# Patient Record
Sex: MALE | Race: WHITE | NOT HISPANIC OR LATINO | Employment: FULL TIME | URBAN - METROPOLITAN AREA
[De-identification: names, ages, dates, MRNs, and addresses within clinical notes are randomized per-mention and may not be internally consistent; named-entity substitution may affect disease eponyms.]

---

## 2023-08-03 ENCOUNTER — OFFICE VISIT (OUTPATIENT)
Dept: FAMILY MEDICINE | Facility: CLINIC | Age: 28
End: 2023-08-03
Payer: COMMERCIAL

## 2023-08-03 VITALS
TEMPERATURE: 99.1 F | HEART RATE: 101 BPM | DIASTOLIC BLOOD PRESSURE: 78 MMHG | OXYGEN SATURATION: 98 % | HEIGHT: 71 IN | WEIGHT: 193 LBS | SYSTOLIC BLOOD PRESSURE: 121 MMHG | RESPIRATION RATE: 16 BRPM | BODY MASS INDEX: 27.02 KG/M2

## 2023-08-03 DIAGNOSIS — Z11.59 NEED FOR HEPATITIS C SCREENING TEST: ICD-10-CM

## 2023-08-03 DIAGNOSIS — A69.20 LYME DISEASE WITH ERYTHEMA MIGRANS LESION 5 CM OR GREATER IN DIAMETER: Primary | ICD-10-CM

## 2023-08-03 DIAGNOSIS — Z11.4 SCREENING FOR HIV (HUMAN IMMUNODEFICIENCY VIRUS): ICD-10-CM

## 2023-08-03 PROCEDURE — 86618 LYME DISEASE ANTIBODY: CPT | Performed by: FAMILY MEDICINE

## 2023-08-03 PROCEDURE — 87389 HIV-1 AG W/HIV-1&-2 AB AG IA: CPT | Performed by: FAMILY MEDICINE

## 2023-08-03 PROCEDURE — 99204 OFFICE O/P NEW MOD 45 MIN: CPT | Performed by: FAMILY MEDICINE

## 2023-08-03 PROCEDURE — 36415 COLL VENOUS BLD VENIPUNCTURE: CPT | Performed by: FAMILY MEDICINE

## 2023-08-03 PROCEDURE — 86617 LYME DISEASE ANTIBODY: CPT | Performed by: FAMILY MEDICINE

## 2023-08-03 PROCEDURE — 86803 HEPATITIS C AB TEST: CPT | Performed by: FAMILY MEDICINE

## 2023-08-03 RX ORDER — DOXYCYCLINE 100 MG/1
100 CAPSULE ORAL 2 TIMES DAILY
Qty: 28 CAPSULE | Refills: 0 | Status: SHIPPED | OUTPATIENT
Start: 2023-08-03 | End: 2023-08-17

## 2023-08-03 ASSESSMENT — PAIN SCALES - GENERAL: PAINLEVEL: NO PAIN (0)

## 2023-08-03 NOTE — PROGRESS NOTES
"  Assessment & Plan     Lyme disease with erythema migrans lesion 5 cm or greater in diameter  -3 weeks before ,Jovanni had fever, fatigue which subsided in few days.  After few days, started noticing rash with central clearing in different parts of his body.  -Multiple rash with central clearing noted.  -Denied itching.  No cardiac issues.  No concerns of arthritis.  No neurological signs.    PLAN:  -We will start on Doxy 100 mg twice daily for 14 days.  -Notified Jovanni to let me know via MyChart if he has any issues tolerating the medication.  -Explained the possible complications and sequelae of Lymes.    - Lyme Disease Total Abs Bld with Reflex to Confirm CLIA; Future  - doxycycline monohydrate (MONODOX) 100 MG capsule; Take 1 capsule (100 mg) by mouth 2 times daily for 14 days  - Lyme Disease Total Abs Bld with Reflex to Confirm CLIA    Screening for HIV (human immunodeficiency virus)  -Routine screening.    - HIV Antigen Antibody Combo; Future  - HIV Antigen Antibody Combo    Need for hepatitis C screening test  -Routine screening.    - Hepatitis C Screen Reflex to HCV RNA Quant and Genotype; Future  - Hepatitis C Screen Reflex to HCV RNA Quant and Genotype             BMI:   Estimated body mass index is 26.72 kg/m  as calculated from the following:    Height as of this encounter: 1.81 m (5' 11.26\").    Weight as of this encounter: 87.5 kg (193 lb).           Barby Estrella MD  [facility]    Pepper Baig is a 28 year old, presenting for the following health issues:  Rash      8/3/2023    10:39 AM   Additional Questions   Roomed by Galindo   Accompanied by Self       Rash  Associated symptoms include a rash.   History of Present Illness       Reason for visit:  New rash, concern for lyme's  Symptom onset:  1-2 weeks ago  Symptoms include:  Enlarging red spots  Symptom intensity:  Mild  Symptom progression:  Worsening  Had these symptoms before:  No  What makes it " "worse:  Time  What makes it better:  No    He eats 0-1 servings of fruits and vegetables daily.He consumes 0 sweetened beverage(s) daily.He exercises with enough effort to increase his heart rate 60 or more minutes per day.  He exercises with enough effort to increase his heart rate 6 days per week.   He is taking medications regularly.               Review of Systems   Skin:  Positive for rash.      Constitutional, HEENT, cardiovascular, pulmonary, gi and gu systems are negative, except as otherwise noted.      Objective    /78 (BP Location: Left arm, Patient Position: Chair, Cuff Size: Adult Large)   Pulse 101   Temp 99.1  F (37.3  C) (Oral)   Resp 16   Ht 1.81 m (5' 11.26\")   Wt 87.5 kg (193 lb)   SpO2 98%   BMI 26.72 kg/m    Body mass index is 26.72 kg/m .  Physical Exam   GENERAL: healthy, alert and no distress  NECK: no adenopathy, no asymmetry, masses, or scars and thyroid normal to palpation  RESP: lungs clear to auscultation - no rales, rhonchi or wheezes  CV: regular rate and rhythm, normal S1 S2, no S3 or S4, no murmur, click or rub, no peripheral edema and peripheral pulses strong  ABDOMEN: soft, nontender, no hepatosplenomegaly, no masses and bowel sounds normal  MS: no gross musculoskeletal defects noted, no edema  Multiple (5-6) more than 5 cm circular lesions with central clearing noted in chest, abdomen, back.                "

## 2023-08-03 NOTE — PATIENT INSTRUCTIONS
At [facility], we strive to deliver an exceptional experience to you, every time we see you. If you receive a survey, please complete it as we do value your feedback.  If you have MyChart, you can expect to receive results automatically within 24 hours of their completion.  Your provider will send a note interpreting your results as well.   If you do not have MyChart, you should receive your results in about a week by mail.    Your care team:                            Family Medicine Internal Medicine   MD Lawrence Goddard MD Shantel Branch-Fleming, MD Srinivasa Vaka, MD Katya Belousova, PANORRIS Higuera CNP, MD ([facility]   Isac Smith, MD Radha Peterson MD Amelia Massimini APRN ADAM Mckeon APRN MD Barby Sellers MD          Clinic hours: Monday - Thursday 7 am-6 pm; Fridays 7 am-5 pm.   Urgent care: Monday - Friday 10 am- 8 pm; Saturday and Sunday 9 am-5 pm.    Clinic: [phone]       [facility]: Monday - Thursday 8 am - 7 pm; Friday 8 am - 6 pm  [facility]: [phone]

## 2023-08-04 LAB
B BURGDOR IGG SERPL QL IA: 32 INDEX
B BURGDOR IGG SERPL QL IA: REACTIVE
B BURGDOR IGG+IGM SER QL: >10
B BURGDOR IGM SERPL QL IA: >8 INDEX
B BURGDOR IGM SERPL QL IA: REACTIVE
HCV AB SERPL QL IA: NONREACTIVE
HIV 1+2 AB+HIV1 P24 AG SERPL QL IA: NONREACTIVE

## 2023-08-13 ENCOUNTER — HEALTH MAINTENANCE LETTER (OUTPATIENT)
Age: 28
End: 2023-08-13

## 2024-10-06 ENCOUNTER — HEALTH MAINTENANCE LETTER (OUTPATIENT)
Age: 29
End: 2024-10-06

## 2024-10-17 PROBLEM — L60.0 INGROWN TOENAIL: Status: ACTIVE | Noted: 2020-01-01

## 2024-10-18 ENCOUNTER — OFFICE VISIT (OUTPATIENT)
Dept: PODIATRY | Facility: CLINIC | Age: 29
End: 2024-10-18
Payer: COMMERCIAL

## 2024-10-18 VITALS
BODY MASS INDEX: 29.4 KG/M2 | WEIGHT: 210 LBS | SYSTOLIC BLOOD PRESSURE: 136 MMHG | HEIGHT: 71 IN | DIASTOLIC BLOOD PRESSURE: 80 MMHG | HEART RATE: 82 BPM

## 2024-10-18 DIAGNOSIS — L60.0 INGROWN TOENAIL: Primary | ICD-10-CM

## 2024-10-18 PROCEDURE — 11730 AVULSION NAIL PLATE SIMPLE 1: CPT | Mod: TA | Performed by: PODIATRIST

## 2024-10-18 PROCEDURE — 11732 AVLSN NAIL PLATE SIMPLE EACH: CPT | Mod: T5 | Performed by: PODIATRIST

## 2024-10-18 NOTE — LETTER
"10/18/2024      Jovanni Hubbard  [address] [facility] [facility] [address]      Dear Colleague,    Thank you for referring your patient, Joavnni Hubbard, to the [facility]. Please see a copy of my visit note below.    Subjective:    Pt is seen today as a new pt referral w/ the c/c of a painful ingrown right and left great nail both border.  This has been problematic over the last year. positivehistory of drainage from the site. This is slowly getting worse.  Aggravated by activity and relieved by rest.  Has tried soaking which has not helped.   denies history of trauma to the area.  Denies fever and chill, denies numbness and tingling, denies erythema on dorsum of foot.  Lworks at [employer] on his feet.      ROS:  see above    No past medical history on file.    No past surgical history on file.    No family history on file.    Social History     Tobacco Use     Smoking status: Never     Smokeless tobacco: Never   Substance Use Topics     Alcohol use: Not on file       No current outpatient medications on file.       Allergies   Allergen Reactions     Amoxicillin      Erythromycin Hives     Sulfa Antibiotics Unknown       /80   Pulse 82   Ht 1.803 m (5' 11\")   Wt 95.3 kg (210 lb)   BMI 29.29 kg/m  .      Constitutional/ general:  Pt is in no apparent distress, appears well-nourished.  Cooperative with history and physical exam.     Psych:  The patient answered questions appropriately.  Normal affect.  Seems to have reasonable expectations, in terms of treatment.     Lungs:  Non labored breathing, non labored speech. No cough.  No audible wheezing. Even, quiet breathing.       Vascular:  Pedal pulses are palpable bilaterally for both the DP and PT arteries.  CFT < 3 sec.  No ankle varicosities or edema.  Pedal hair growth noted.     Neuro:  Alert and oriented x 3. Coordinated gait.  Light touch sensation is intact     Derm: Normal texture and turgor.  No ecchymosis, or cyanosis.  " Hair growth noted.        Musculoskeletal:     Patient is ambulatory without an assistive device or brace.   right and left great toe nail both border shows soft tissue impingement with localized erythema.   positive active drainage/purulence at this time.  No sinus tracts.  No nailbed masses or exostosis.  No pain with range of motion of IPJ or MTPJ.  No ascending cellulitis.    ASSESSMENT:    Onychocryptosis with paronychia right and left toe.    Discussed etiology and treatment options in detail w/ the pt.  The potential causes and nature of an ingrown toenail were discussed with the patient.  We reviewed the natural history/prognosis of the condition and potential risks if no treatment is provided.      After thorough discussion and answering all questions, the patient elected to have nail avulsion.  Obtained consent, used 3cc of 1% lidocaine plain to block right and left first toe.  Sterile prep, then avulsed the affected border(s).  No evidence of deep abscess noted.  Pt tolerated procedure well.  Sterile bandage placed, gave wound care instruction.  Instructed patient on trimming nails correctly.  They will avoid tight shoes.  Avoid pedicures.  Discussed permanent removal of border if chronic problem.  If patient would like to have this done in the future will send me a My[employer]Studio[employer]ap[employer] message and we will set up 1 hour appointment in approximately 4 to 5 months.  Return to clinic prn.    Cristian Malhotra DPM, FACFAS        Again, thank you for allowing me to participate in the care of your patient.        Sincerely,        Cristian Malhotra DPM

## 2024-10-18 NOTE — PROGRESS NOTES
"Subjective:    Pt is seen today as a new pt referral w/ the c/c of a painful ingrown right and left great nail both border.  This has been problematic over the last year. positivehistory of drainage from the site. This is slowly getting worse.  Aggravated by activity and relieved by rest.  Has tried soaking which has not helped.   denies history of trauma to the area.  Denies fever and chill, denies numbness and tingling, denies erythema on dorsum of foot.  Lworks at [employer] on his feet.      ROS:  see above    No past medical history on file.    No past surgical history on file.    No family history on file.    Social History     Tobacco Use    Smoking status: Never    Smokeless tobacco: Never   Substance Use Topics    Alcohol use: Not on file       No current outpatient medications on file.       Allergies   Allergen Reactions    Amoxicillin     Erythromycin Hives    Sulfa Antibiotics Unknown       /80   Pulse 82   Ht 1.803 m (5' 11\")   Wt 95.3 kg (210 lb)   BMI 29.29 kg/m  .      Constitutional/ general:  Pt is in no apparent distress, appears well-nourished.  Cooperative with history and physical exam.     Psych:  The patient answered questions appropriately.  Normal affect.  Seems to have reasonable expectations, in terms of treatment.     Lungs:  Non labored breathing, non labored speech. No cough.  No audible wheezing. Even, quiet breathing.       Vascular:  Pedal pulses are palpable bilaterally for both the DP and PT arteries.  CFT < 3 sec.  No ankle varicosities or edema.  Pedal hair growth noted.     Neuro:  Alert and oriented x 3. Coordinated gait.  Light touch sensation is intact     Derm: Normal texture and turgor.  No ecchymosis, or cyanosis.  Hair growth noted.        Musculoskeletal:     Patient is ambulatory without an assistive device or brace.   right and left great toe nail both border shows soft tissue impingement with localized erythema.   positive active drainage/purulence at this " time.  No sinus tracts.  No nailbed masses or exostosis.  No pain with range of motion of IPJ or MTPJ.  No ascending cellulitis.    ASSESSMENT:    Onychocryptosis with paronychia right and left toe.    Discussed etiology and treatment options in detail w/ the pt.  The potential causes and nature of an ingrown toenail were discussed with the patient.  We reviewed the natural history/prognosis of the condition and potential risks if no treatment is provided.      After thorough discussion and answering all questions, the patient elected to have nail avulsion.  Obtained consent, used 3cc of 1% lidocaine plain to block right and left first toe.  Sterile prep, then avulsed the affected border(s).  No evidence of deep abscess noted.  Pt tolerated procedure well.  Sterile bandage placed, gave wound care instruction.  Instructed patient on trimming nails correctly.  They will avoid tight shoes.  Avoid pedicures.  Discussed permanent removal of border if chronic problem.  If patient would like to have this done in the future will send me a MyChart message and we will set up 1 hour appointment in approximately 4 to 5 months.  Return to clinic prn.    Cristian Malhotra DPM, FACFAS

## 2024-10-18 NOTE — PATIENT INSTRUCTIONS
We wish you continued good healing. If you have any questions or concerns, please do not hesitate to contact us at  [phone]    [facility] (secure e-mail communication and access to your chart) to send a message or to make an appointment.    Please remember to call and schedule a follow up appointment if one was recommended at your earliest convenience.     PODIATRY CLINIC HOURS  TELEPHONE NUMBER    Dr. Cristian BORJAPISABEL FACFAS        Clinics:  [facility]  Tuesday 1PM-6PM  [place]  Wednesday 745AM-330PM  [place]  Monday 2nd,4th  830AM-4PM  Thursday/Friday 745AM-230PM     [place] APPOINTMENTS  [phone]    [place] APPOINTMENTS  [phone]          If you need a medication refill, please contact us you may need lab work and/or a follow up visit prior to your refill (i.e. Antifungal medications).  If MRI needed please call Imaging at [phone]   HOW DO I GET MY KNEE SCOOTER? Knee scooters can be picked up at ANY Medical Supply stores with your knee scooter Prescription.  OR  Bring your signed prescription to an [facility] Medical [facility] showroom.   Set up an appointment for your custom Orthotics. Call any Orthotics locations call [phone]         INGROWN TOENAIL REMOVAL AFTERCARE     Go directly home and elevate the affected foot on one or two pillows for the remainder of the day/evening if possible. Your toe may stay numb anywhere from 2-8 hours.   Take Tylenol, ibuprofen or another anti-inflammatory as needed for pain.   That evening of the procedure,  you may remove the bandage.(you may soak it in warm soapy water ) After soaks, pat the area dry and then allow to airdry for a few minutes. Apply antibiotic ointment to the area and cover with  band-aid.  The following day. Find a shoe that is comfortable and minimizes the amount of rubbing on your toe, as this increases pain.  Dress with band-aids until no  longer draining, typically 3 days.  Watch for any signs and symptoms of infection such as: redness, red streaks going up the foot/leg, swelling, pus or foul odor. Fevers > 101   Please call with questions.    Dr. Cristian Malhotra D.P.M FAC FAS

## 2025-03-13 ENCOUNTER — OFFICE VISIT (OUTPATIENT)
Dept: PODIATRY | Facility: CLINIC | Age: 30
End: 2025-03-13
Attending: PODIATRIST
Payer: COMMERCIAL

## 2025-03-13 VITALS — WEIGHT: 220 LBS | HEIGHT: 71 IN | BODY MASS INDEX: 30.8 KG/M2

## 2025-03-13 DIAGNOSIS — L60.0 INGROWN TOENAIL: Primary | ICD-10-CM

## 2025-03-13 NOTE — PATIENT INSTRUCTIONS
We wish you continued good healing. If you have any questions or concerns, please do not hesitate to contact us at  [phone]    [facility] (secure e-mail communication and access to your chart) to send a message or to make an appointment.    Please remember to call and schedule a follow up appointment if one was recommended at your earliest convenience.     PODIATRY CLINIC HOURS  TELEPHONE NUMBER    Dr. Cristian BORJAPISABEL FACFAS        Clinics:  [facility]   [place]  Tuesday 1PM-6PM  [place]  Wednesday 745AM-330PM  [place]  Monday 2nd,4th  830AM-4PM  Thursday/Friday 745AM-230PM     [place] APPOINTMENTS  [phone]    [place] APPOINTMENTS  [phone]          If you need a medication refill, please contact us you may need lab work and/or a follow up visit prior to your refill (i.e. Antifungal medications).  If MRI needed please call Imaging at [phone]   HOW DO I GET MY KNEE SCOOTER? Knee scooters can be picked up at ANY Medical Supply stores with your knee scooter Prescription.  OR  Bring your signed prescription to an [facility] Medical Equipment showroom.   Set up an appointment for your custom Orthotics. Call any Orthotics locations call [phone]

## 2025-03-13 NOTE — LETTER
"3/13/2025      Jovanni Hubbard  [address] [facility] [address]      Dear Colleague,    Thank you for referring your patient, Jovanni Hubbard, to the [facility]. Please see a copy of my visit note below.    Subjective:    Pt is seen today for ingrown great toenail.  Points to right first toe both border(s).   Has had a temporary in the past.  Would like a permanent today.  Denies F/C/N/V.      ROS:  see above       Allergies   Allergen Reactions     Amoxicillin      Erythromycin Hives     Sulfa Antibiotics Unknown       No current outpatient medications on file.       Patient Active Problem List   Diagnosis     Ingrown toenail       No past medical history on file.    No past surgical history on file.    No family history on file.    Social History     Tobacco Use     Smoking status: Never     Smokeless tobacco: Never   Substance Use Topics     Alcohol use: Not on file         Objective:    Ht 1.803 m (5' 11\")   Wt 99.8 kg (220 lb)   BMI 30.68 kg/m    Pulses are palpable +2/4 DP & PT bilateral.  Sensation to light touch is intact.  No fore foot deformities are noted.  Normal ROM all forefoot joints.  No evidence of deep abscess or infection noted.  Pain on palpation of right first toe both borders.  Erythema, edema, and some proud tissue noted.  Nail appears to be incurvated on the affected border.     Assessment:  Onychocryptosis with paronychia right first toe both border(s)    Plan:  Discussed etiology and treatment options with the pt.  The potential causes and nature of an ingrown toenail were discussed with the patient.  We reviewed the natural history/prognosis of the condition and potential risks if no treatment is provided.       After thorough discussion and answering all questions, the patient elected to have permanent removal of right first toe both borders.  Risk complications and efficacy discussed with patient.  Obtained consent, used 3cc of 1% lidocaine plain to " block aformentioned toe(s).  Sterile prep, then avulsed right first toe both border(s).  No evidence of deep abscess noted.  Phenol was applied times 3 at 30 second intervals with curettage in between and then alcohol rinse.  Pt tolerated procedure well.  Sterile bandage placed, gave wound care instruction.  Return to clinic prn.    Cristian Malhotra DPM, FACFAS           Again, thank you for allowing me to participate in the care of your patient.        Sincerely,        Crisitan Malhotra DPM    Electronically signed

## 2025-03-13 NOTE — PROGRESS NOTES
"Subjective:    Pt is seen today for ingrown great toenail.  Points to right first toe both border(s).   Has had a temporary in the past.  Would like a permanent today.  Denies F/C/N/V.      ROS:  see above       Allergies   Allergen Reactions    Amoxicillin     Erythromycin Hives    Sulfa Antibiotics Unknown       No current outpatient medications on file.       Patient Active Problem List   Diagnosis    Ingrown toenail       No past medical history on file.    No past surgical history on file.    No family history on file.    Social History     Tobacco Use    Smoking status: Never    Smokeless tobacco: Never   Substance Use Topics    Alcohol use: Not on file         Objective:    Ht 1.803 m (5' 11\")   Wt 99.8 kg (220 lb)   BMI 30.68 kg/m    Pulses are palpable +2/4 DP & PT bilateral.  Sensation to light touch is intact.  No fore foot deformities are noted.  Normal ROM all forefoot joints.  No evidence of deep abscess or infection noted.  Pain on palpation of right first toe both borders.  Erythema, edema, and some proud tissue noted.  Nail appears to be incurvated on the affected border.     Assessment:  Onychocryptosis with paronychia right first toe both border(s)    Plan:  Discussed etiology and treatment options with the pt.  The potential causes and nature of an ingrown toenail were discussed with the patient.  We reviewed the natural history/prognosis of the condition and potential risks if no treatment is provided.       After thorough discussion and answering all questions, the patient elected to have permanent removal of right first toe both borders.  Risk complications and efficacy discussed with patient.  Obtained consent, used 3cc of 1% lidocaine plain to block aformentioned toe(s).  Sterile prep, then avulsed right first toe both border(s).  No evidence of deep abscess noted.  Phenol was applied times 3 at 30 second intervals with curettage in between and then alcohol rinse.  Pt tolerated procedure " well.  Sterile bandage placed, gave wound care instruction.  Return to clinic prn.    Cristian Malhotra, LANE, FACFAS